# Patient Record
(demographics unavailable — no encounter records)

---

## 2025-01-08 NOTE — IMAGING
[Right] : right wrist [de-identified] : Normal gait  Cervical spine Healed midline incision Severely decreased active range of motion Moderate to severe tenderness paraspinals bilaterally.  Moderate to severe tenderness lower midline cervical and upper thoracic region with palpable bony prominence at the base of the neck  Right shoulder No swelling Moderate to severe diffuse tenderness Active forward flexion 70, external rotation 45, internal rotation to gluteal region bilaterally Supraspinatus strength 4-/5  Left shoulder Healed anterior incisions Moderate to severe diffuse tenderness Active forward flexion 70, external rotation 45, internal rotation to gluteal region Supraspinatus strength 4-/5  Right wrist and hand No swelling Moderately decreased active range of motion of the wrist Moderate difficulty making close fist Moderate tenderness over the radial styloid.  4 mm palpable swelling over the radial styloid Positive Finklestein's test Markedly positive Tinel's over the median nerve Radial pulse 2+  Left wrist and hand No swelling Healed palmar incision Mildly decreased active range of motion wrist Mild difficulty making close fist Mild tenderness palmar wrist Radial pulse 2+ [FreeTextEntry1] : Reviewed and interpreted.  Right wrist AP, lateral and oblique views-mild degenerative changes of the basal joint

## 2025-01-08 NOTE — DATA REVIEWED
[CT Scan] : CT scan [MRI] : MRI [Cervical Spine] : cervical spine [Report was reviewed and noted in the chart] : The report was reviewed and noted in the chart [FreeTextEntry1] : This was done October 23 at Dr. Kyle radiology Reported as: IMPRESSION: Postsurgical changes related to posterior fusion hardware from C3-T1 and anterior fusion hardware at C3-C4 and C4-C6 with C5 corpectomy. The hardware is intact without evidence of loosening. At C3-4: No canal stenosis or neural foraminal narrowing at the operative level. At C4-5: Posterior osseous ridging mildly narrowing the spinal canal. There is no neural foraminal narrowing. At C5-6: Posterior osseous ridging with uncinate hypertrophy resulting in mild spinal canal stenosis and mild bilateral neural foraminal narrowing. At C6-7: No canal stenosis or neural foraminal narrowing at the operative level.  ICD 10 - Spinal fusion (surgery), Z98.1   Signed by: Pankaj Rowley MD  Signed Date: 10/23/2024 7:13 PM EDT   [FreeTextEntry2] : This was done at Dr. Kyle October 23, 2024 Reported as Impression: Postsurgical changes related to posterior fusion hardware from C3-T1 and anterior fusion hardware at C3-C4 and C4-C6 with C5 corpectomy. The hardware is intact without evidence of loosening. At C3-4: No canal stenosis or neural foraminal narrowing at the operative level. At C4-5: Posterior osseous ridging mildly narrowing the spinal canal. There is no neural foraminal narrowing. At C5-6: Posterior osseous ridging with uncinate hypertrophy resulting in mild spinal canal stenosis and mild bilateral neural foraminal narrowing. At C6-7: No canal stenosis or neural foraminal narrowing at the operative level.  ICD 10 - Spinal fusion (surgery), Z98.1  Signed by: Pankaj Rowley MD  Signed Date: 10/23/2024 6:45 PM EDT

## 2025-01-08 NOTE — DISCUSSION/SUMMARY
[de-identified] : The patient lives in [place] currently.  He is undergoing workup for pleural effusion.  He has lost 20 pounds recently Ice to his shoulders and right wrist p.r.n. Gabapentin 600 mg 2 tablets b.i.d. Unable to take NSAIDs because of kidney dysfunction Risk benefits and alternatives of prescribed medication(s) were discussed with the patient.  Side effects were discussed and questions were answered.  Discontinue medication if any issues.  To call me if any questions or concerns He will find a primary care physician and pain management physician in [place] for continued care Continue medical marijuana. Continue topical CBD and THC Continue home exercises I offered him a cortisone injection right wrist today. Risks including infection, swelling, stiffness, bleeding in addition to other associated risks with joint/tendon injection, benefits and alternatives were discussed with the patient He wanted to do this  Impression: Chronic Cervical strain/spondylosis/radiculopathy/stenosis-status post anterior and posterior cervical decompression and fusion November 12 and 17 by Dr. Renny Bhatia Soft tissue flap coverage posterior spine January 14, 2021 by Dr. 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  Status post left carpal tunnel release June 29, 2022 / synovitis left wrist

## 2025-01-08 NOTE — HISTORY OF PRESENT ILLNESS
[Work related] : work related [Gradual] : gradual [7] : 7 [Dull/Aching] : dull/aching [Constant] : constant [Meds] : meds [Lying in bed] : lying in bed [de-identified] : The patient has continued moderate to severe pain and stiffness in his neck and shoulders.  Pain with movement.  Pain awakens him from sleep at night.  Pain radiates down his arms with numbness and tingling.  He saw Dr. Kemar Bauer and had CT scan of the cervical spine and MRI cervical spine Moderate to severe pain in his shoulders reaching above him and behind him Moderate to severe pain right hand with activities.  Moderate pain left hand with activities.   He has had increased pain right radial wrist over the past 3 months.  Pain lifting and grasping.  Clicking in his wrist with movement He has been trying to do home exercises [] : Post Surgical Visit: no [FreeTextEntry3] : 5/11/2001 [FreeTextEntry9] : Gabapentin [de-identified] : 9/12/2024 [de-identified] : Dr. Conteh

## 2025-01-08 NOTE — PROCEDURE
[Right] : of the right [Wrist] : wrist [De Robert's] : slime young's [Pain] : pain [Alcohol] : alcohol [Betadine] : betadine [Ethyl Chloride sprayed topically] : ethyl chloride sprayed topically [Sterile technique used] : sterile technique used [___ cc    6mg] :  Betamethasone (Celestone) ~Vcc of 6mg [___ cc    1%] : Lidocaine ~Vcc of 1%  [] : Patient tolerated procedure well [Patient was advised to rest the joint(s) for ____ days] : patient was advised to rest the joint(s) for [unfilled] days [Risks, benefits, alternatives discussed / Verbal consent obtained] : the risks benefits, and alternatives have been discussed, and verbal consent was obtained [FreeTextEntry3] : Do not submerge underwater for 24 hours He will monitor his blood sugar over the next 4 days

## 2025-05-14 NOTE — HISTORY OF PRESENT ILLNESS
[Work related] : work related [Gradual] : gradual [7] : 7 [Dull/Aching] : dull/aching [Constant] : constant [Meds] : meds [Lying in bed] : lying in bed [de-identified] : The patient is unchanged since last visit The patient has continued moderate to severe pain and stiffness in his neck and shoulders.  Pain with movement.  Pain awakens him from sleep at night.  Pain radiates down his arms with numbness and tingling. Moderate to severe pain in his shoulders reaching above him and behind him Moderate to severe pain right hand with activities.  Moderate pain left hand with activities.   He has had continued right radial wrist.  Pain lifting and grasping.  Clicking in his wrist with movement.  Using thumb spica splint prn He has been trying to do home exercises [] : Post Surgical Visit: no [FreeTextEntry3] : 5/11/2001 [FreeTextEntry9] : Gabapentin [de-identified] : 1/8/2025 [de-identified] : Dr. Conteh [de-identified] : MRI

## 2025-05-14 NOTE — DISCUSSION/SUMMARY
[de-identified] :  Ice to his shoulders and right wrist p.r.n. Gabapentin 600 mg 2 tablets b.i.d. Unable to take NSAIDs because of kidney dysfunction Risk benefits and alternatives of prescribed medication(s) were discussed with the patient.  Side effects were discussed and questions were answered.  Discontinue medication if any issues.  To call me if any questions or concerns He will find a primary care physician and pain management physician in [place] for continued care Continue medical marijuana. Continue topical CBD and THC Continue home exercises He will schedule follow-up appointment to see Dr. Gama Monge when he returns to [place] in September  Impression: Chronic Cervical strain/spondylosis/radiculopathy/stenosis-status post anterior and posterior cervical decompression and fusion November 12 and 17 by Dr. Renny Bhatia Soft tissue flap coverage posterior spine January 14, 2021 by Dr. 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  Status post left carpal tunnel release June 29, 2022 / synovitis left wrist

## 2025-05-14 NOTE — IMAGING
[Right] : right wrist [FreeTextEntry1] : Reviewed and interpreted.  Right wrist AP, lateral and oblique views-mild degenerative changes of the basal joint [de-identified] : Normal gait  Cervical spine Healed midline incision Severely decreased active range of motion Moderate to severe tenderness paraspinals bilaterally.  Moderate to severe tenderness lower midline cervical and upper thoracic region with palpable bony prominence at the base of the neck  Right shoulder No swelling Moderate to severe diffuse tenderness Active forward flexion 80, external rotation 45, internal rotation to gluteal region bilaterally Supraspinatus strength 4-/5  Left shoulder Healed anterior incisions Moderate to severe diffuse tenderness Active forward flexion 80, external rotation 45, internal rotation to gluteal region Supraspinatus strength 4-/5  Right wrist and hand No swelling Moderately decreased active range of motion of the wrist Moderate difficulty making close fist Moderate tenderness over the radial styloid.  4 mm palpable swelling over the radial styloid Positive Finklestein's test Markedly positive Tinel's over the median nerve Radial pulse 2+  Left wrist and hand No swelling Healed palmar incision Mildly decreased active range of motion wrist Mild difficulty making close fist Mild tenderness palmar wrist Radial pulse 2+